# Patient Record
Sex: FEMALE | Race: WHITE | HISPANIC OR LATINO | URBAN - METROPOLITAN AREA
[De-identification: names, ages, dates, MRNs, and addresses within clinical notes are randomized per-mention and may not be internally consistent; named-entity substitution may affect disease eponyms.]

---

## 2022-01-28 ENCOUNTER — OFFICE VISIT (OUTPATIENT)
Dept: URBAN - METROPOLITAN AREA TELEHEALTH 2 | Facility: TELEHEALTH | Age: 62
End: 2022-01-28
Payer: COMMERCIAL

## 2022-01-28 DIAGNOSIS — Z12.11 COLON CANCER SCREENING: ICD-10-CM

## 2022-01-28 DIAGNOSIS — E73.8 OTHER LACTOSE INTOLERANCE: ICD-10-CM

## 2022-01-28 DIAGNOSIS — K21.9 GERD WITHOUT ESOPHAGITIS: ICD-10-CM

## 2022-01-28 PROBLEM — 271681002 STOMACH PAIN: Status: ACTIVE | Noted: 2022-01-28

## 2022-01-28 PROBLEM — 782415009: Status: ACTIVE | Noted: 2022-01-28

## 2022-01-28 PROCEDURE — 99203 OFFICE O/P NEW LOW 30 MIN: CPT | Performed by: INTERNAL MEDICINE

## 2022-01-28 RX ORDER — SODIUM, POTASSIUM,MAG SULFATES 17.5-3.13G
354ML SOLUTION, RECONSTITUTED, ORAL ORAL
Qty: 345 MILLILITER | Refills: 0 | OUTPATIENT
Start: 2022-01-28 | End: 2022-01-29

## 2022-01-28 RX ORDER — PANTOPRAZOLE SODIUM 40 MG/1
1 TABLET TABLET, DELAYED RELEASE ORAL ONCE A DAY
Qty: 30 | Refills: 3 | OUTPATIENT
Start: 2022-01-28

## 2022-01-28 NOTE — HPI-TODAY'S VISIT:
Patient referred by Stella Irvin MD for  CRC screening and evaluation of chronic GERD. Copy of this consult sent to Dr. Irvin. This is a Telehealth OV to which patient has agreed to. Limitations of telehealth discussed; she understands and agrees to proceed. Patient's @ home during this virtual OV. 60 yo yo pt, originally from [facility], w several mos of p-prandial epigastric pain, bloating, distention, w/o N/ V / jen HELGA sxs and no dysphagia /odynophagia. Has developed lactose intolerance. Weight stable and good appetite. Normal bm's wo bleeding nor melenic stools. Denies cardiorespiratory sxs.Has had recent labs which are pending. No previous screening colonoscopy; no FHx CC / pp /IBD / GI malignancies. Had mild COVID-19 5/20 and she's fully COVID-19 vaccinated. breath sounds equal/no rhonchi/no wheezes/no rales/good air movement yes/PNA/Flu

## 2022-03-19 PROBLEM — 266435005: Status: ACTIVE | Noted: 2022-01-28

## 2022-04-04 ENCOUNTER — OFFICE VISIT (OUTPATIENT)
Dept: URBAN - METROPOLITAN AREA SURGERY CENTER 18 | Facility: SURGERY CENTER | Age: 62
End: 2022-04-04

## 2023-02-15 ENCOUNTER — OFFICE VISIT (OUTPATIENT)
Dept: URBAN - METROPOLITAN AREA CLINIC 92 | Facility: CLINIC | Age: 63
End: 2023-02-15
Payer: COMMERCIAL

## 2023-02-15 ENCOUNTER — LAB OUTSIDE AN ENCOUNTER (OUTPATIENT)
Dept: URBAN - METROPOLITAN AREA CLINIC 92 | Facility: CLINIC | Age: 63
End: 2023-02-15

## 2023-02-15 ENCOUNTER — WEB ENCOUNTER (OUTPATIENT)
Dept: URBAN - METROPOLITAN AREA CLINIC 92 | Facility: CLINIC | Age: 63
End: 2023-02-15

## 2023-02-15 VITALS
SYSTOLIC BLOOD PRESSURE: 110 MMHG | DIASTOLIC BLOOD PRESSURE: 76 MMHG | HEART RATE: 76 BPM | BODY MASS INDEX: 25.13 KG/M2 | WEIGHT: 128 LBS | TEMPERATURE: 97 F | HEIGHT: 60 IN

## 2023-02-15 DIAGNOSIS — Z86.19 HISTORY OF HELICOBACTER PYLORI INFECTION: ICD-10-CM

## 2023-02-15 DIAGNOSIS — R10.31 RIGHT LOWER QUADRANT ABDOMINAL PAIN: ICD-10-CM

## 2023-02-15 DIAGNOSIS — R11.0 NAUSEA: ICD-10-CM

## 2023-02-15 DIAGNOSIS — Z12.11 SCREENING FOR COLON CANCER: ICD-10-CM

## 2023-02-15 DIAGNOSIS — K57.92 DIVERTICULITIS: ICD-10-CM

## 2023-02-15 DIAGNOSIS — R10.13 EPIGASTRIC PAIN: ICD-10-CM

## 2023-02-15 DIAGNOSIS — R10.32 LEFT LOWER QUADRANT PAIN: ICD-10-CM

## 2023-02-15 PROCEDURE — 99214 OFFICE O/P EST MOD 30 MIN: CPT

## 2023-02-15 NOTE — HPI-TODAY'S VISIT:
62-year-old female presents today for hospital visit follow-up.  She was seen today utilizing translation services via tablet.  Per record review via [facility] portal, patient was seen back in August 2022 at [facility] with complaints of abdominal pain.  During that time a CT scan was done that demonstrated sigmoid diverticulitis with no free air or abscess.  She was subsequently started on Flagyl and Levaquin which she took.  She had a follow-up with her primary care provider after and stated she felt better.  She then started having abdominal pain again and presented to [facility] on 1- due to right lower abdominal pain.  During that time another CT scan was performed and this showed acute diverticulitis of the ascending right colon just at the hepatic flexure with no evidence of abscess or pneumoperitoneum.  She was discharged with Augmentin which she states she took.  She notes that since despite this last dose of antibiotics she still has abdominal pain in the LLQ, RLQ and epigastric area. Pain is present daily and is dull. She states she has been seeing Dr. Ivey and he referred her here.  No CT has been done since her hospital visit.  She states she is still having some nausea but denies any vomiting, fever, chills.  Denies acid reflux, dysphagia, odynophagia, NSAID use.  She is currently using tramadol for her pain.  She states she did follow a low residue diet for a few days after her recent flare but she is no longer doing this.  She is having normal bowel movements daily and denies any hematochezia, melena.  She states she has lost some weight since her abdominal pain started .  She notes she is never had a colonoscopy or egd before.  No family history of GI cancers. She does note she has a history of H. pylori and this was treated 2 years ago by an unknown doctor.  This was diagnosed via stool test she believes she does not remember if this was retested after her course of antibiotic treatment.

## 2023-02-16 ENCOUNTER — LAB OUTSIDE AN ENCOUNTER (OUTPATIENT)
Dept: URBAN - METROPOLITAN AREA CLINIC 98 | Facility: CLINIC | Age: 63
End: 2023-02-16

## 2023-02-16 ENCOUNTER — TELEPHONE ENCOUNTER (OUTPATIENT)
Dept: URBAN - METROPOLITAN AREA CLINIC 92 | Facility: CLINIC | Age: 63
End: 2023-02-16

## 2023-02-19 LAB
CREATININE POC: 0.6
PERFORMING LAB: (no result)

## 2023-02-22 PROBLEM — 307496006: Status: ACTIVE | Noted: 2023-02-15

## 2023-03-08 ENCOUNTER — TELEPHONE ENCOUNTER (OUTPATIENT)
Dept: URBAN - METROPOLITAN AREA CLINIC 92 | Facility: CLINIC | Age: 63
End: 2023-03-08

## 2023-03-17 ENCOUNTER — OFFICE VISIT (OUTPATIENT)
Dept: URBAN - METROPOLITAN AREA MEDICAL CENTER 12 | Facility: MEDICAL CENTER | Age: 63
End: 2023-03-17

## 2023-03-21 ENCOUNTER — TELEPHONE ENCOUNTER (OUTPATIENT)
Dept: URBAN - METROPOLITAN AREA CLINIC 92 | Facility: CLINIC | Age: 63
End: 2023-03-21

## 2023-04-07 ENCOUNTER — OFFICE VISIT (OUTPATIENT)
Dept: URBAN - METROPOLITAN AREA SURGERY CENTER 16 | Facility: SURGERY CENTER | Age: 63
End: 2023-04-07

## 2023-04-17 ENCOUNTER — OFFICE VISIT (OUTPATIENT)
Dept: URBAN - METROPOLITAN AREA CLINIC 92 | Facility: CLINIC | Age: 63
End: 2023-04-17

## 2023-10-03 ENCOUNTER — OFFICE VISIT (OUTPATIENT)
Dept: URBAN - METROPOLITAN AREA CLINIC 92 | Facility: CLINIC | Age: 63
End: 2023-10-03
Payer: COMMERCIAL

## 2023-10-03 ENCOUNTER — WEB ENCOUNTER (OUTPATIENT)
Dept: URBAN - METROPOLITAN AREA CLINIC 92 | Facility: CLINIC | Age: 63
End: 2023-10-03

## 2023-10-03 VITALS
HEART RATE: 73 BPM | WEIGHT: 133.8 LBS | DIASTOLIC BLOOD PRESSURE: 83 MMHG | HEIGHT: 60 IN | BODY MASS INDEX: 26.27 KG/M2 | TEMPERATURE: 96.2 F | SYSTOLIC BLOOD PRESSURE: 129 MMHG

## 2023-10-03 DIAGNOSIS — Z12.11 SCREENING FOR COLON CANCER: ICD-10-CM

## 2023-10-03 DIAGNOSIS — R10.32 LEFT LOWER QUADRANT PAIN: ICD-10-CM

## 2023-10-03 DIAGNOSIS — K57.92 DIVERTICULITIS: ICD-10-CM

## 2023-10-03 DIAGNOSIS — R11.0 NAUSEA: ICD-10-CM

## 2023-10-03 DIAGNOSIS — R10.13 EPIGASTRIC PAIN: ICD-10-CM

## 2023-10-03 DIAGNOSIS — R10.31 RIGHT LOWER QUADRANT ABDOMINAL PAIN: ICD-10-CM

## 2023-10-03 DIAGNOSIS — Z86.19 HISTORY OF HELICOBACTER PYLORI INFECTION: ICD-10-CM

## 2023-10-03 PROCEDURE — 99213 OFFICE O/P EST LOW 20 MIN: CPT

## 2023-10-03 RX ORDER — SODIUM, POTASSIUM,MAG SULFATES 17.5-3.13G
177ML SOLUTION, RECONSTITUTED, ORAL ORAL
Qty: 1 KIT | Refills: 0 | OUTPATIENT
Start: 2023-10-03 | End: 2023-10-04

## 2023-10-03 NOTE — HPI-TODAY'S VISIT:
63-year-old female presents in follow up for diverticulitis.     Per record review via [facility] portal, patient was seen back in August 2022 at [facility] with complaints of abdominal pain.  During that time a CT scan was done that demonstrated sigmoid diverticulitis with no free air or abscess.  She was subsequently started on Flagyl and Levaquin which she took.  She had a follow-up with her primary care provider after and stated she felt better.  She then started having abdominal pain again and presented to [facility] on 1- due to right lower abdominal pain.  During that time another CT scan was performed and this showed acute diverticulitis of the ascending right colon just at the hepatic flexure with no evidence of abscess or pneumoperitoneum.  She was discharged with Augmentin which she states she took.  No CT has been done since her hospital visit.  She notes she is never had a colonoscopy or egd before.  No family history of GI cancers.     She does note she has a history of H. pylori and this was treated 2 years ago by an unknown doctor.  This was diagnosed via stool test she believes she does not remember if this was retested after her course of antibiotic treatment.     Today, patient reports her lower abdominal pain has resolved. She c/o intermittent RUQ pain, she is scheduled for a CT scan on 10/10/23 to r/o gallbladder etiology for her pain. Patient denies nausea, vomiting, diarrhea, constipation, hematochezia, or melena. She has a daily BM. Denies acid reflux, dysphagia, odynophagia, NSAID use.  She is currently using tramadol for her pain.      Patient denies cardiac, lung, or kidney issues. No blood thinner, No pacemaker, No home O2

## 2023-10-09 ENCOUNTER — TELEPHONE ENCOUNTER (OUTPATIENT)
Dept: URBAN - METROPOLITAN AREA CLINIC 92 | Facility: CLINIC | Age: 63
End: 2023-10-09

## 2023-10-09 RX ORDER — POLYETHYLENE GLYCOL-3350 AND ELECTROLYTES WITH FLAVOR PACK 240; 5.84; 2.98; 6.72; 22.72 G/278.26G; G/278.26G; G/278.26G; G/278.26G; G/278.26G
4000ML POWDER, FOR SOLUTION ORAL
Qty: 4000 MILLILITER | Refills: 0 | OUTPATIENT
Start: 2023-10-09 | End: 2023-10-10

## 2023-10-10 ENCOUNTER — TELEPHONE ENCOUNTER (OUTPATIENT)
Dept: URBAN - METROPOLITAN AREA CLINIC 92 | Facility: CLINIC | Age: 63
End: 2023-10-10

## 2023-10-10 RX ORDER — POLYETHYLENE GLYCOL-3350 AND ELECTROLYTES WITH FLAVOR PACK 240; 5.84; 2.98; 6.72; 22.72 G/278.26G; G/278.26G; G/278.26G; G/278.26G; G/278.26G
4000ML POWDER, FOR SOLUTION ORAL
Qty: 4000 MILLILITER | Refills: 0 | OUTPATIENT
Start: 2023-10-11 | End: 2023-10-12

## 2023-10-20 ENCOUNTER — OFFICE VISIT (OUTPATIENT)
Dept: URBAN - METROPOLITAN AREA SURGERY CENTER 16 | Facility: SURGERY CENTER | Age: 63
End: 2023-10-20
Payer: COMMERCIAL

## 2023-10-20 ENCOUNTER — CLAIMS CREATED FROM THE CLAIM WINDOW (OUTPATIENT)
Dept: URBAN - METROPOLITAN AREA CLINIC 4 | Facility: CLINIC | Age: 63
End: 2023-10-20
Payer: COMMERCIAL

## 2023-10-20 DIAGNOSIS — R10.13 ABDOMINAL DISCOMFORT, EPIGASTRIC: ICD-10-CM

## 2023-10-20 DIAGNOSIS — K29.40 CHRONIC ATROPHIC GASTRITIS WITHOUT BLEEDING: ICD-10-CM

## 2023-10-20 DIAGNOSIS — K57.30 ACQUIRED DIVERTICULOSIS OF COLON: ICD-10-CM

## 2023-10-20 DIAGNOSIS — K31.A15 GASTRIC INTESTINAL METAPLASIA WITHOUT DYSPLASIA, INVOLVING MULTIPLE SITES: ICD-10-CM

## 2023-10-20 DIAGNOSIS — K63.89 APPENDICITIS EPIPLOICA: ICD-10-CM

## 2023-10-20 DIAGNOSIS — D12.3 ADENOMA OF TRANSVERSE COLON: ICD-10-CM

## 2023-10-20 DIAGNOSIS — Z12.11 COLON CANCER SCREENING: ICD-10-CM

## 2023-10-20 DIAGNOSIS — K64.8 EXTERNAL HEMORRHOIDS: ICD-10-CM

## 2023-10-20 DIAGNOSIS — K29.40 ATROPHIC GASTRITIS: ICD-10-CM

## 2023-10-20 PROCEDURE — 88313 SPECIAL STAINS GROUP 2: CPT | Performed by: PATHOLOGY

## 2023-10-20 PROCEDURE — 43239 EGD BIOPSY SINGLE/MULTIPLE: CPT | Performed by: INTERNAL MEDICINE

## 2023-10-20 PROCEDURE — G8907 PT DOC NO EVENTS ON DISCHARG: HCPCS | Performed by: INTERNAL MEDICINE

## 2023-10-20 PROCEDURE — 00813 ANES UPR LWR GI NDSC PX: CPT | Performed by: ANESTHESIOLOGY

## 2023-10-20 PROCEDURE — 00813 ANES UPR LWR GI NDSC PX: CPT | Performed by: NURSE ANESTHETIST, CERTIFIED REGISTERED

## 2023-10-20 PROCEDURE — 45385 COLONOSCOPY W/LESION REMOVAL: CPT | Performed by: INTERNAL MEDICINE

## 2023-10-20 PROCEDURE — 88305 TISSUE EXAM BY PATHOLOGIST: CPT | Performed by: PATHOLOGY

## 2023-10-20 PROCEDURE — 88342 IMHCHEM/IMCYTCHM 1ST ANTB: CPT | Performed by: PATHOLOGY

## 2023-10-20 PROCEDURE — 88341 IMHCHEM/IMCYTCHM EA ADD ANTB: CPT | Performed by: PATHOLOGY

## 2023-11-07 ENCOUNTER — TELEPHONE ENCOUNTER (OUTPATIENT)
Dept: URBAN - METROPOLITAN AREA CLINIC 92 | Facility: CLINIC | Age: 63
End: 2023-11-07

## 2023-11-10 ENCOUNTER — DASHBOARD ENCOUNTERS (OUTPATIENT)
Age: 63
End: 2023-11-10

## 2023-11-10 ENCOUNTER — OFFICE VISIT (OUTPATIENT)
Dept: URBAN - METROPOLITAN AREA CLINIC 92 | Facility: CLINIC | Age: 63
End: 2023-11-10
Payer: COMMERCIAL

## 2023-11-10 VITALS
SYSTOLIC BLOOD PRESSURE: 119 MMHG | BODY MASS INDEX: 27.01 KG/M2 | DIASTOLIC BLOOD PRESSURE: 77 MMHG | HEIGHT: 60 IN | WEIGHT: 137.6 LBS | HEART RATE: 75 BPM | TEMPERATURE: 96.1 F

## 2023-11-10 DIAGNOSIS — Z86.19 HISTORY OF HELICOBACTER PYLORI INFECTION: ICD-10-CM

## 2023-11-10 DIAGNOSIS — R10.13 EPIGASTRIC PAIN: ICD-10-CM

## 2023-11-10 DIAGNOSIS — R11.0 NAUSEA: ICD-10-CM

## 2023-11-10 DIAGNOSIS — R10.31 RIGHT LOWER QUADRANT ABDOMINAL PAIN: ICD-10-CM

## 2023-11-10 DIAGNOSIS — Z86.010 PERSONAL HISTORY OF COLONIC POLYPS: ICD-10-CM

## 2023-11-10 DIAGNOSIS — K59.01 SLOW TRANSIT CONSTIPATION: ICD-10-CM

## 2023-11-10 DIAGNOSIS — K57.92 DIVERTICULITIS: ICD-10-CM

## 2023-11-10 DIAGNOSIS — R10.32 LEFT LOWER QUADRANT PAIN: ICD-10-CM

## 2023-11-10 PROBLEM — 35298007: Status: ACTIVE | Noted: 2023-11-10

## 2023-11-10 PROBLEM — 428283002: Status: ACTIVE | Noted: 2023-11-10

## 2023-11-10 PROCEDURE — 99213 OFFICE O/P EST LOW 20 MIN: CPT

## 2023-11-10 NOTE — HPI-TODAY'S VISIT:
63-year-old female presents in follow up for diverticulitis.      Per record review via [facility] portal, patient was seen back in August 2022 at [facility] with complaints of abdominal pain.  During that time a CT scan was done that demonstrated sigmoid diverticulitis with no free air or abscess.  She was subsequently started on Flagyl and Levaquin which she took.  She had a follow-up with her primary care provider after and stated she felt better.  She then started having abdominal pain again and presented to [facility] on 1- due to right lower abdominal pain.  During that time another CT scan was performed and this showed acute diverticulitis of the ascending right colon just at the hepatic flexure with no evidence of abscess or pneumoperitoneum.  She was discharged with Augmentin which she states she took.  No CT has been done since her hospital visit.  She notes she is never had a colonoscopy or egd before.  No family history of GI cancers.     She does note she has a history of H. pylori and this was treated 2 years ago by an unknown doctor.  This was diagnosed via stool test she believes she does not remember if this was retested after her course of antibiotic treatment.  Last OV, patient reported her lower abdominal pain resolved. She c/o intermittent RUQ pain. CT scan on 10/17/23 revealed a normal GB and pancreas, colonic diverticulosis without inflammation. Very minimal changes involving transverse colon diverticulum.   Colonoscopy with Dr. Gr on 10/20/23 revealed diverticulosis in entire examined colon, one 6mm, TA polyp at hepatic flexure, one 4mm polyp in sigmoid, IH , 5 yr repeat. EGD on 10/20/23 revealed Normal esophagus, stomach, and duodenum, bx show chronic inactive gastritis with focal intestinal metaplasia suggestiveof treated H. pylori gastritis.   Today, patient admits to nausea and upper abdominal discomfort with anything she eats. She notes her symptoms are similar to when she had H. pylori in the past. PCP prescribed a PPI but has not yet started it. Denies vomiting. Last night had diarrhea but no epsiodes today. Patient admits she has a daily BM but is having to strain, not taking anything for constipation. Denies hematochezia or melena.  Denies acid reflux, dysphagia, odynophagia. Denies NSAID use. She is currently using tramadol for her pain.

## 2023-11-16 ENCOUNTER — TELEPHONE ENCOUNTER (OUTPATIENT)
Dept: URBAN - METROPOLITAN AREA CLINIC 92 | Facility: CLINIC | Age: 63
End: 2023-11-16

## 2023-11-16 LAB — H PYLORI BREATH TEST: DETECTED

## 2023-11-16 RX ORDER — METRONIDAZOLE 250 MG/1
1 TABLET TABLET ORAL
Qty: 56 TABLET | Refills: 0 | OUTPATIENT
Start: 2023-11-16 | End: 2023-11-30

## 2023-11-16 RX ORDER — TETRACYCLINE HYDROCHLORIDE 500 MG/1
1 CAPSULE CAPSULE ORAL
Qty: 56 CAPSULE | Refills: 0 | OUTPATIENT
Start: 2023-11-16 | End: 2023-11-30

## 2023-11-16 RX ORDER — PANTOPRAZOLE SODIUM 40 MG/1
1 TABLET TABLET, DELAYED RELEASE ORAL TWICE A DAY
Qty: 28 TABLET | Refills: 0 | OUTPATIENT
Start: 2023-11-16

## 2023-11-16 RX ORDER — BISMUTH SUBSALICYLATE 262 MG/1
2 TABLETS WITH MEALS AND AT BEDTIME TABLET, CHEWABLE ORAL
Qty: 112 TABLET | Refills: 0 | OUTPATIENT
Start: 2023-11-16 | End: 2023-11-30

## 2023-12-29 ENCOUNTER — LAB OUTSIDE AN ENCOUNTER (OUTPATIENT)
Dept: URBAN - METROPOLITAN AREA CLINIC 92 | Facility: CLINIC | Age: 63
End: 2023-12-29